# Patient Record
Sex: FEMALE | Race: WHITE | NOT HISPANIC OR LATINO | Employment: OTHER | URBAN - METROPOLITAN AREA
[De-identification: names, ages, dates, MRNs, and addresses within clinical notes are randomized per-mention and may not be internally consistent; named-entity substitution may affect disease eponyms.]

---

## 2022-03-30 ENCOUNTER — HISTORICAL (OUTPATIENT)
Dept: ADMINISTRATIVE | Facility: HOSPITAL | Age: 87
End: 2022-03-30

## 2022-03-30 LAB
BUN SERPL-MCNC: 15.7 MG/DL (ref 9.8–20.1)
CALCIUM SERPL-MCNC: 9.5 MG/DL (ref 8.7–10.5)
CHLORIDE SERPL-SCNC: 106 MMOL/L (ref 98–107)
CO2 SERPL-SCNC: 27 MMOL/L (ref 23–31)
CREAT SERPL-MCNC: 0.76 MG/DL (ref 0.55–1.02)
CREAT/UREA NIT SERPL: 21
GLUCOSE SERPL-MCNC: 96 MG/DL (ref 82–115)
HEMOLYSIS INTERF INDEX SERPL-ACNC: 3
ICTERIC INTERF INDEX SERPL-ACNC: 1
LIPEMIC INTERF INDEX SERPL-ACNC: 3
POTASSIUM SERPL-SCNC: 4.3 MMOL/L (ref 3.5–5.1)
SODIUM SERPL-SCNC: 141 MMOL/L (ref 136–145)

## 2022-06-13 ENCOUNTER — OFFICE VISIT (OUTPATIENT)
Dept: URGENT CARE | Facility: CLINIC | Age: 87
End: 2022-06-13
Payer: MEDICARE

## 2022-06-13 VITALS
HEIGHT: 60 IN | TEMPERATURE: 99 F | BODY MASS INDEX: 25.52 KG/M2 | OXYGEN SATURATION: 97 % | HEART RATE: 75 BPM | DIASTOLIC BLOOD PRESSURE: 83 MMHG | WEIGHT: 130 LBS | SYSTOLIC BLOOD PRESSURE: 143 MMHG | RESPIRATION RATE: 18 BRPM

## 2022-06-13 DIAGNOSIS — L02.414 ABSCESS OF LEFT FOREARM: Primary | ICD-10-CM

## 2022-06-13 PROCEDURE — 99213 OFFICE O/P EST LOW 20 MIN: CPT | Mod: ,,, | Performed by: FAMILY MEDICINE

## 2022-06-13 PROCEDURE — 99213 PR OFFICE/OUTPT VISIT, EST, LEVL III, 20-29 MIN: ICD-10-PCS | Mod: ,,, | Performed by: FAMILY MEDICINE

## 2022-06-13 RX ORDER — ALENDRONATE SODIUM 70 MG/1
70 TABLET ORAL
COMMUNITY
Start: 2022-05-29

## 2022-06-13 RX ORDER — LEVOTHYROXINE SODIUM 100 UG/1
100 TABLET ORAL DAILY
COMMUNITY
Start: 2022-05-19

## 2022-06-13 RX ORDER — GLUCOSAM/CHONDRO/HERB 149/HYAL 750-100 MG
TABLET ORAL
COMMUNITY

## 2022-06-13 RX ORDER — ACETAMINOPHEN 500 MG
TABLET ORAL
COMMUNITY

## 2022-06-13 RX ORDER — ESCITALOPRAM OXALATE 5 MG/1
5 TABLET ORAL DAILY
COMMUNITY
Start: 2022-05-04

## 2022-06-13 RX ORDER — SULFAMETHOXAZOLE AND TRIMETHOPRIM 800; 160 MG/1; MG/1
1 TABLET ORAL 2 TIMES DAILY
Qty: 14 TABLET | Refills: 0 | Status: SHIPPED | OUTPATIENT
Start: 2022-06-13 | End: 2022-06-20

## 2022-06-13 NOTE — PROGRESS NOTES
Subjective:       Patient ID: Stefania White is a 87 y.o. female.    Vitals:  height is 5' (1.524 m) and weight is 59 kg (130 lb). Her oral temperature is 98.5 °F (36.9 °C). Her blood pressure is 143/83 (abnormal) and her pulse is 75. Her respiration is 18 and oxygen saturation is 97%.     Chief Complaint: nodule (Hard nodule on upper left forearm with pain upon touching x 1 week)    Hard nodule on upper left forearm with pain upon touching x 1 week.  No fever.     Other  This is a new problem. The current episode started 1 to 4 weeks ago. The problem occurs constantly. The problem has been gradually worsening. Pertinent negatives include no fever. Nothing aggravates the symptoms. She has tried nothing for the symptoms. The treatment provided no relief.       Constitution: Negative for fever.   Musculoskeletal: Negative for pain and trauma.   Skin: Positive for lesion.   Neurological: Negative for dizziness.       Objective:      Physical Exam   Pulmonary/Chest: Effort normal.   Abdominal: Normal appearance.   Neurological: no focal deficit. She is alert.   Skin:         Comments: Crusted raised lesion to L forearm approx 1x1cm with surrounding erythema   Psychiatric: Her behavior is normal. Mood normal.   Nursing note and vitals reviewed.chaperone present           Assessment:       1. Abscess of left forearm          Plan:         Abscess of left forearm  -     sulfamethoxazole-trimethoprim 800-160mg (BACTRIM DS) 800-160 mg Tab; Take 1 tablet by mouth 2 (two) times daily. for 7 days  Dispense: 14 tablet; Refill: 0

## 2022-06-13 NOTE — PATIENT INSTRUCTIONS
Petroleum or Aquaphor to thickened skin, Bactrim twice a day.  Recheck if not improving.  Consider recheck of skin lesion by dermatology.

## 2023-06-24 ENCOUNTER — OFFICE VISIT (OUTPATIENT)
Dept: URGENT CARE | Facility: CLINIC | Age: 88
End: 2023-06-24
Payer: MEDICARE

## 2023-06-24 VITALS
WEIGHT: 132 LBS | HEIGHT: 60 IN | TEMPERATURE: 99 F | OXYGEN SATURATION: 95 % | BODY MASS INDEX: 25.91 KG/M2 | RESPIRATION RATE: 17 BRPM | DIASTOLIC BLOOD PRESSURE: 79 MMHG | SYSTOLIC BLOOD PRESSURE: 136 MMHG | HEART RATE: 69 BPM

## 2023-06-24 DIAGNOSIS — J02.9 SORE THROAT: ICD-10-CM

## 2023-06-24 DIAGNOSIS — B37.0 ORAL THRUSH: Primary | ICD-10-CM

## 2023-06-24 DIAGNOSIS — R05.9 COUGH, UNSPECIFIED TYPE: ICD-10-CM

## 2023-06-24 LAB
CTP QC/QA: YES
MOLECULAR STREP A: NEGATIVE
POC MOLECULAR INFLUENZA A AGN: NEGATIVE
POC MOLECULAR INFLUENZA B AGN: NEGATIVE
SARS-COV-2 RDRP RESP QL NAA+PROBE: NEGATIVE

## 2023-06-24 PROCEDURE — 99213 OFFICE O/P EST LOW 20 MIN: CPT | Mod: ,,, | Performed by: FAMILY MEDICINE

## 2023-06-24 PROCEDURE — 87651 POCT STREP A MOLECULAR: ICD-10-PCS | Mod: QW,,, | Performed by: FAMILY MEDICINE

## 2023-06-24 PROCEDURE — 87502 POCT INFLUENZA A/B MOLECULAR: ICD-10-PCS | Mod: QW,,, | Performed by: FAMILY MEDICINE

## 2023-06-24 PROCEDURE — 99213 PR OFFICE/OUTPT VISIT, EST, LEVL III, 20-29 MIN: ICD-10-PCS | Mod: ,,, | Performed by: FAMILY MEDICINE

## 2023-06-24 PROCEDURE — 87635: ICD-10-PCS | Mod: QW,,, | Performed by: FAMILY MEDICINE

## 2023-06-24 PROCEDURE — 87651 STREP A DNA AMP PROBE: CPT | Mod: QW,,, | Performed by: FAMILY MEDICINE

## 2023-06-24 PROCEDURE — 87502 INFLUENZA DNA AMP PROBE: CPT | Mod: QW,,, | Performed by: FAMILY MEDICINE

## 2023-06-24 PROCEDURE — 87635 SARS-COV-2 COVID-19 AMP PRB: CPT | Mod: QW,,, | Performed by: FAMILY MEDICINE

## 2023-06-24 RX ORDER — AMOXICILLIN 500 MG/1
500 CAPSULE ORAL EVERY 8 HOURS
Qty: 21 CAPSULE | Refills: 0 | Status: SHIPPED | OUTPATIENT
Start: 2023-06-24 | End: 2023-06-24

## 2023-06-24 RX ORDER — NYSTATIN 100000 [USP'U]/ML
4 SUSPENSION ORAL 4 TIMES DAILY
Qty: 80 ML | Refills: 0 | Status: SHIPPED | OUTPATIENT
Start: 2023-06-24 | End: 2023-06-29

## 2023-06-24 NOTE — PATIENT INSTRUCTIONS
Discussed the physical finding, condition and course.  Concerns of oral thrush.  Monitor the symptoms.  Will hold antibiotics for now.  No fever, strep test negative.  Warm saltwater gargles for sore throat.  Follow the directions on the prescription for swish and spit or swallow as needed.  Patient can check with pharmacist.  Return to clinic in 3 days for quick evaluation.  Flu test negative, strep test negative, COVID-19 test negative

## 2023-06-24 NOTE — PROGRESS NOTES
Subjective:      Patient ID: Stefania White is a 88 y.o. female.    Vitals:  height is 5' (1.524 m) and weight is 59.9 kg (132 lb). Her temperature is 98.7 °F (37.1 °C). Her blood pressure is 136/79 and her pulse is 69. Her respiration is 17 and oxygen saturation is 95%.     Chief Complaint: Sinus Problem (Sinus congestion, ear pressure, chest congestion, cough, phlem 4 days. No otc meds taken. )    HPI:  88-year-old female present to clinic with concerns of worsening nasal congestion, sinus congestion, bilateral ear pressure and chest congestion since 4 days.  Symptoms gradual in onset and worsening.  No measured fever at home.  Over-the-counter medications not much help.    ROS :  Constitutional : _ feverish , Body aches, Chills  HENT_sore throat, postnasal drainage, states swollen neck glands, feels like something stuck in her throat on the right side  Respiratory_no wheezing, no shortness of breath  Cardiovascular_no chest pain  Gastrointestinal_ no nausea, no difficulty swallowing   Musculoskeletal_no joint pain, no joint swelling  Integumentary_no skin rash or abnormal lesion    Objective:     Physical Exam  General : Alert and Oriented, No apparent distress, afebrile, appears comfortable in the exam chair, clear speech and appropriate communication.  O2 sats 97%  Neck - supple, anterior cervical lymph nodes enlarged tender to palpate  HENT : Oropharynx and tonsils appears erythematous and swollen, bilateral posterior pharynx scant oral thrush clinically.  Bilateral  TMs intact mild fluid no redness.   Respiratory : Bilateral equal breath sounds, nonlabored respirations  Cardiovascular : Rate, rhythm regular, normal volume pulse, no murmur  Integumentary : Warm, Dry and no rash    Assessment:     1. Oral thrush    2. Cough, unspecified type    3. Sore throat      Plan:   Discussed the physical finding, condition and course.  Concerns of oral thrush.  Monitor the symptoms.  Will hold antibiotics for now.  No  fever, strep test negative.  Warm saltwater gargles for sore throat.  Follow the directions on the prescription for swish and spit or swallow as needed.  Patient can check with pharmacist.  Return to clinic in 3 days for quick evaluation.  Flu test negative, strep test negative, COVID-19 test negative    Oral thrush  -     nystatin (MYCOSTATIN) 100,000 unit/mL suspension; Take 4 mLs (400,000 Units total) by mouth 4 (four) times daily. for 5 days  Dispense: 80 mL; Refill: 0    Cough, unspecified type  -     POCT COVID-19 Rapid Screening  -     POCT Influenza A/B Molecular  -     POCT Strep A, Molecular    Sore throat    Other orders  -     Discontinue: amoxicillin (AMOXIL) 500 MG capsule; Take 1 capsule (500 mg total) by mouth every 8 (eight) hours. for 7 days  Dispense: 21 capsule; Refill: 0

## 2024-01-27 ENCOUNTER — HOSPITAL ENCOUNTER (EMERGENCY)
Facility: HOSPITAL | Age: 89
Discharge: HOME | End: 2024-01-27
Attending: EMERGENCY MEDICINE
Payer: COMMERCIAL

## 2024-01-27 VITALS
DIASTOLIC BLOOD PRESSURE: 92 MMHG | RESPIRATION RATE: 18 BRPM | TEMPERATURE: 99 F | HEART RATE: 81 BPM | SYSTOLIC BLOOD PRESSURE: 128 MMHG | HEIGHT: 60 IN | WEIGHT: 140 LBS | OXYGEN SATURATION: 100 % | BODY MASS INDEX: 27.48 KG/M2

## 2024-01-27 DIAGNOSIS — S22.080A COMPRESSION FRACTURE OF T12 VERTEBRA, INITIAL ENCOUNTER: Primary | ICD-10-CM
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
## 2024-01-27 DIAGNOSIS — S32.050A CLOSED COMPRESSION FRACTURE OF L5 LUMBAR VERTEBRA, INITIAL ENCOUNTER: ICD-10-CM

## 2024-01-27 PROCEDURE — 99284 EMERGENCY DEPT VISIT MOD MDM: CPT | Mod: 25

## 2024-01-27 NOTE — ED PROVIDER NOTES
Encounter Date: 1/27/2024       History     Chief Complaint   Patient presents with    Fall     Pt complaint of fall from a ladder 2 weeks ago with worsening pain from lower back radiating upwards. Pt amb with painful movements and stiffness     88-year-old female complains of falling off a ladder 2 weeks ago directly on her butt.  She states the pain is in herlow back radiating to the right buttocks.  As time has gone on, pain is getting worse and she also seems to be having pain a little higher up in the back.  She is ambulatory without assistance but has lot of pain.  She has constipation which is chronic.  No urinary incontinence.  No weakness or numbness legs.        Review of patient's allergies indicates:  No Known Allergies  Past Medical History:   Diagnosis Date    Hypothyroidism      Past Surgical History:   Procedure Laterality Date    HYSTERECTOMY       Family History   Problem Relation Age of Onset    No Known Problems Mother     No Known Problems Father     No Known Problems Sister     No Known Problems Brother      Social History     Tobacco Use    Smoking status: Former    Smokeless tobacco: Never   Substance Use Topics    Alcohol use: Yes     Alcohol/week: 2.0 standard drinks of alcohol     Types: 2 Drinks containing 0.5 oz of alcohol per week    Drug use: Never     Review of Systems   Musculoskeletal:  Positive for back pain.   All other systems reviewed and are negative.      Physical Exam     Initial Vitals [01/27/24 1030]   BP Pulse Resp Temp SpO2   (!) 128/92 81 18 98.6 °F (37 °C) 100 %      MAP       --         Physical Exam    Nursing note and vitals reviewed.  Constitutional: She appears well-developed and well-nourished.   HENT:   Head: Normocephalic and atraumatic.   Neck: Neck supple.   No tenderness to Cspine or cervical paraspinous muscles   Normal range of motion.  Cardiovascular:  Normal rate and regular rhythm.           Pulmonary/Chest: Breath sounds normal. No respiratory distress.    Abdominal: Abdomen is soft. There is no abdominal tenderness.   Musculoskeletal:         General: No tenderness or edema. Normal range of motion.      Cervical back: Normal range of motion and neck supple.      Comments: No tenderness elicited with palpation of the T-spine or L-spine, palpation of the sacrum.  However, any movement causes her to grimace and she points to the upper sacrum, lumbar region as the source of pain.  She is having difficulty getting comfortable with sitting or standing due to pain     Neurological: She is alert and oriented to person, place, and time. GCS score is 15. GCS eye subscore is 4. GCS verbal subscore is 5. GCS motor subscore is 6.   Skin: Skin is warm and dry. Capillary refill takes less than 2 seconds.   Psychiatric: She has a normal mood and affect. Thought content normal.         ED Course   Procedures  Labs Reviewed - No data to display       Imaging Results              X-Ray Thoracic Spine AP Lateral (Final result)  Result time 01/27/24 11:28:11      Final result by Sukhdev Crump MD (01/27/24 11:28:11)                   Narrative:    EXAMINATION  XR THORACIC SPINE AP LATERAL    CLINICAL HISTORY  Fall (on) (from) unspecified stairs and steps, initial encounter; fall occurred 2 weeks ago.    TECHNIQUE  A total of 3 images submitted of the thoracic spine.    COMPARISON  None available at the time of initial interpretation.    FINDINGS  Vertebral segments: No cortical displacement, acute compression deformity, or focal lesion. There are multilevel mild chronic appearing vertebral body compression deformities and degenerative changes throughout the thoracic spine.  No evidence of traumatic subluxation.    Curvature: Normal curvature is maintained.    Disc spaces: Multilevel intervertebral space narrowing is present, chronic and degenerative in appearance.    Soft tissues: No acute or focal abnormality.    IMPRESSION  No definite acute radiographic abnormality.  Chronic  secondary details discussed above.      Electronically signed by: Sukhdev Crump  Date:    01/27/2024  Time:    11:28                                     X-Ray Lumbar Spine Ap And Lateral (Final result)  Result time 01/27/24 11:29:10      Final result by Luke Aleman MD (01/27/24 11:29:10)                   Impression:      Moderate to severe degenerative change with age indeterminate compression fractures at T12 and believed L5 with complete compression.  No prior imaging available for comparison.  Recommend further evaluation with MR when able.      Electronically signed by: Luke Aleman  Date:    01/27/2024  Time:    11:29               Narrative:    EXAMINATION:  XR LUMBAR SPINE AP AND LATERAL    CLINICAL HISTORY:  fall;    TECHNIQUE:  AP, lateral and spot images were performed of the lumbar spine.    COMPARISON:  None    FINDINGS:  Appearance of mild compression fracture at T12 with a poorly visualized L5 vertebral body which may be completely compressed.  No prior imaging available for comparison.  Neural foraminal narrowing severely at L3-L4.  Facet joint hypertrophy throughout.                                       X-Ray Hips Bilateral 2 View Incl AP Pelvis (Final result)  Result time 01/27/24 11:26:48      Final result by Luke Aleman MD (01/27/24 11:26:48)                   Impression:      Degenerative changes of the bilateral hips with no acute fracture appreciated.      Electronically signed by: Luke Aleman  Date:    01/27/2024  Time:    11:26               Narrative:    EXAMINATION:  XR HIPS BILATERAL 2 VIEW INCL AP PELVIS    CLINICAL HISTORY:  Unspecified fall, initial encounter    TECHNIQUE:  AP view of the pelvis and frogleg lateral views of both hips were performed.    COMPARISON:  None.    FINDINGS:  The pubic symphysis is congruent.  The sacroiliac joints are symmetric.  Degenerative changes with bilateral acetabular osteophytes.  The bilateral femoral necks are intact.                                        Medications - No data to display  Medical Decision Making  88-year-old female complains of falling off a ladder 2 weeks ago directly on her butt.  She states the pain is very low and low back radiating to the right buttocks.  As time has gone on, pain is getting worse and she also seems to be having pain a little higher up in the back.  She is ambulatory without assistance but has lot of pain.  She has constipation which is chronic.  No urinary incontinence.  No weakness or numbness legs.      Differential diagnosis includes but is not limited to fracture, contusion, sprain, radiculopathy    Amount and/or Complexity of Data Reviewed  Radiology: ordered.  Discussion of management or test interpretation with external provider(s): Patient was seen and evaluated in the emergency department with history, physical exam, x-ray.  She was noted to have fracture of T12 and L5.  I have requested a TLSO brace and will get a CT scan.                                      Clinical Impression:  Final diagnoses:  [W10.9XXA] Fall (on) (from) unspecified stairs and steps, initial encounter  [W19.XXXA] Fall  [S22.080A] Compression fracture of T12 vertebra, initial encounter (Primary)  [S32.050A] Closed compression fracture of L5 lumbar vertebra, initial encounter                 Josephine Whipple MD  01/27/24 [id number]

## 2024-01-27 NOTE — ED TRIAGE NOTES
Pt complaint of fall from a ladder 2 weeks ago with worsening pain from lower back radiating upwards. Pt amb with painful movements and stiffness